# Patient Record
Sex: FEMALE | Race: BLACK OR AFRICAN AMERICAN
[De-identification: names, ages, dates, MRNs, and addresses within clinical notes are randomized per-mention and may not be internally consistent; named-entity substitution may affect disease eponyms.]

---

## 2019-11-07 ENCOUNTER — HOSPITAL ENCOUNTER (EMERGENCY)
Dept: HOSPITAL 87 - ER | Age: 24
Discharge: HOME | End: 2019-11-07
Payer: COMMERCIAL

## 2019-11-07 VITALS — BODY MASS INDEX: 22.04 KG/M2 | HEIGHT: 65 IN | WEIGHT: 132.28 LBS

## 2019-11-07 VITALS — SYSTOLIC BLOOD PRESSURE: 120 MMHG | DIASTOLIC BLOOD PRESSURE: 80 MMHG

## 2019-11-07 DIAGNOSIS — S62.315A: Primary | ICD-10-CM
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
## 2019-11-07 DIAGNOSIS — S80.11XA: ICD-10-CM

## 2019-11-07 DIAGNOSIS — S90.32XA: ICD-10-CM

## 2019-11-07 DIAGNOSIS — Z23: ICD-10-CM

## 2019-11-07 PROCEDURE — 81025 URINE PREGNANCY TEST: CPT

## 2019-11-07 PROCEDURE — 73130 X-RAY EXAM OF HAND: CPT

## 2019-11-07 PROCEDURE — 99283 EMERGENCY DEPT VISIT LOW MDM: CPT

## 2019-11-07 PROCEDURE — 96372 THER/PROPH/DIAG INJ SC/IM: CPT

## 2019-11-07 PROCEDURE — 29125 APPL SHORT ARM SPLINT STATIC: CPT

## 2019-11-07 PROCEDURE — 73590 X-RAY EXAM OF LOWER LEG: CPT

## 2019-11-07 PROCEDURE — 90715 TDAP VACCINE 7 YRS/> IM: CPT

## 2019-11-07 PROCEDURE — 90471 IMMUNIZATION ADMIN: CPT

## 2019-11-07 PROCEDURE — 73630 X-RAY EXAM OF FOOT: CPT

## 2019-11-09 ENCOUNTER — HOSPITAL ENCOUNTER (EMERGENCY)
Dept: HOSPITAL 72 - EMR | Age: 24
Discharge: HOME | End: 2019-11-09
Payer: COMMERCIAL

## 2019-11-09 VITALS — DIASTOLIC BLOOD PRESSURE: 68 MMHG | SYSTOLIC BLOOD PRESSURE: 112 MMHG

## 2019-11-09 VITALS — SYSTOLIC BLOOD PRESSURE: 118 MMHG | DIASTOLIC BLOOD PRESSURE: 71 MMHG

## 2019-11-09 VITALS — WEIGHT: 145 LBS | HEIGHT: 62 IN | BODY MASS INDEX: 26.68 KG/M2
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
## 2019-11-09 DIAGNOSIS — S80.01XA: Primary | ICD-10-CM

## 2019-11-09 DIAGNOSIS — M54.2: ICD-10-CM

## 2019-11-09 DIAGNOSIS — S62.344A: ICD-10-CM

## 2019-11-09 PROCEDURE — 29505 APPLICATION LONG LEG SPLINT: CPT

## 2019-11-09 PROCEDURE — 99284 EMERGENCY DEPT VISIT MOD MDM: CPT

## 2019-11-09 NOTE — NUR
ED Nurse Note:



Pt cleared by health care Provider for discharge after spling, ace wrap, sling 
applied on Lt arm.  DC instructions/prescription was given and explained to pt 
and verbalized understanding of teachings. All medical deviecs such as ID band  
removed. Pt is AAO x4, ambulatory and left with all personal belongings.
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
